# Patient Record
Sex: FEMALE | Race: WHITE | URBAN - METROPOLITAN AREA
[De-identification: names, ages, dates, MRNs, and addresses within clinical notes are randomized per-mention and may not be internally consistent; named-entity substitution may affect disease eponyms.]

---

## 2017-09-28 ENCOUNTER — THERAPY VISIT (OUTPATIENT)
Dept: PHYSICAL THERAPY | Facility: CLINIC | Age: 67
End: 2017-09-28
Payer: COMMERCIAL

## 2017-09-28 DIAGNOSIS — M25.552 HIP PAIN, LEFT: Primary | ICD-10-CM

## 2017-09-28 PROCEDURE — 97110 THERAPEUTIC EXERCISES: CPT | Mod: GP | Performed by: PHYSICAL THERAPIST

## 2017-09-28 PROCEDURE — 97161 PT EVAL LOW COMPLEX 20 MIN: CPT | Mod: GP | Performed by: PHYSICAL THERAPIST

## 2017-09-28 NOTE — MR AVS SNAPSHOT
After Visit Summary   9/28/2017    Sobia Mendez    MRN: [id number]           Patient Information     Date Of Birth          1950        Visit Information        Provider Department      9/28/2017 11:30 AM Krista Maldonado, PT [facility]        Today's Diagnoses     Hip pain, left    -  1       Follow-ups after your visit        Your next 10 appointments already scheduled     Oct 05, 2017  8:10 AM CDT   YOSI Extremity with Krista Maldonado PT   [facility] ([facility])    [address]  [facility] [address]   [phone]            Oct 11, 2017  3:10 PM CDT   YOSI Extremity with Krista Maldonado PT   [facility] ([facility])    [address]  [facility] [address]   [phone]            Oct 18, 2017  1:50 PM CDT   YOSI Extremity with Krista Maldonado PT   [facility] ([facility])    [address]  [facility] [address]   [phone]            Oct 25, 2017 11:20 AM CDT   YOSI Extremity with Krista Maldonado PT   [facility] ([facility])    [address]  [facility] [address]   [phone]              Who to contact     If you have questions or need follow up information about today's clinic visit or your schedule please contact [facility] PHYSICAL THERAPY directly at [phone].  Normal or non-critical lab and imaging results will be communicated to you by MyChart, letter or phone within 4 business days after the clinic has received the results. If you do not hear from us within 7 days, please contact the clinic through MyChart or phone. If you have a critical or abnormal lab result, we will notify you by phone as soon as  "possible.  Submit refill requests through [employer] or call your pharmacy and they will forward the refill request to us. Please allow 3 business days for your refill to be completed.          Additional Information About Your Visit        [employer] Information     [employer] lets you send messages to your doctor, view your test results, renew your prescriptions, schedule appointments and more. To sign up, go to [email or web address] . Click on \"Log in\" on the left side of the screen, which will take you to the Welcome page. Then click on \"Sign up Now\" on the right side of the page.     You will be asked to enter the access code listed below, as well as some personal information. Please follow the directions to create your username and password.     Your access code is: [email or web address]  Expires: 2017  3:08 PM     Your access code will  in 90 days. If you need help or a new code, please call your [place] clinic or [phone].        Care EveryWhere ID     This is your Care EveryWhere ID. This could be used by other organizations to access your [place] medical records  [id number]         Blood Pressure from Last 3 Encounters:   No data found for BP    Weight from Last 3 Encounters:   No data found for Wt              We Performed the Following     HC PT EVAL, LOW COMPLEXITY     YOSI INITIAL EVAL REPORT     THERAPEUTIC EXERCISES        Primary Care Provider Office Phone # Fax #    Vic Alexandre [phone]       [address] [address]        Equal Access to Services     [facility] CenterNAUN : Hadii aad ku hadasho Soomaali, waaxda luqadaha, qaybta kaalmada adeegyada, kendal jerome . So [facility] [phone].    ATENCIÓN: Si habla español, tiene a patricio disposición servicios gratuitos de asistencia lingüística. Llame al [phone].    We comply with applicable federal civil rights laws and [place] laws. We do not discriminate on " the basis of race, color, national origin, age, disability sex, sexual orientation or gender identity.            Thank you!     Thank you for choosing [facility] PHYSICAL [facility]  for your care. Our goal is always to provide you with excellent care. Hearing back from our patients is one way we can continue to improve our services. Please take a few minutes to complete the written survey that you may receive in the mail after your visit with us. Thank you!             Your Updated Medication List - Protect others around you: Learn how to safely use, store and throw away your medicines at www.disposemymeds.org.      Notice  As of 9/28/2017  3:08 PM    You have not been prescribed any medications.

## 2017-09-28 NOTE — LETTER
[facility]IC [facility] PHYSICAL THERAPY  [address]  [facility] [address] [phone]    2017    Re: Sobia Mendez   :   1950  MRN:  [id number]   REFERRING PHYSICIAN:   Pablo Allison    [facility]    Date of Initial Evaluation:  2017  Visits:  Rxs Used: 1  Reason for Referral:  Hip pain, left    EVALUATION SUMMARY    Subjective:    Patient is a 67 year old female presenting with rehab left hip hpi. The history is provided by the patient. No  was used.   Sobia Mendez is a 67 year old female with a left hip condition.  Condition occurred with:  Insidious onset.  Condition occurred: for unknown reasons.  This is a new condition  Pt began to have pain in her L hip that she first noticed in 2017 when she was trying to sleep. She had pain with ABD in the water during her aerobics to the point where she couldn't do it. She feels like her hip has slowly gotten a little better. She can now sleep on it but she can't stay asleep on her hip. PMH: RCR, TKA on L..    Patient reports pain:  Lateral and greater trochanter.  Radiates to:  Thigh and hip.  Pain is described as aching and is intermittent Pain Scale: when it wakes her at night around a 6-7/10.  Associated symptoms:  Loss of motion/stiffness. Pain is worse in the P.M. and worse during the night.  Symptoms are exacerbated by descending stairs, ascending stairs and lying on extremity and relieved by activity/movement (changes position at night).  Since onset symptoms are gradually improving.        General health as reported by patient is good.  Pertinent medical history includes:  Osteoporosis, history of fractures and asthma (fracture to pubic stress fracture in , stress fracture L hip in 2016).  Medical allergies: no.  Other surgeries include:  Orthopedic surgery (R RCR and L TKA).  Current medications:   Other (bone density).  Current occupation is Business office  .  Patient is working in normal job without restrictions.  Primary job tasks include:  Prolonged sitting.    Barriers include:  Stairs and no railing on stairs.    Red flags:  None as reported by the patient.  Objective:  Hip Evaluation  HIP AROM:  AROM:    Left Hip:     Normal (pain on end range Flexion only)    Right Hip:   Normal    Hip Palpation:    Left hip tenderness present at:   Greater Trachanter; IT Band and Gluteus Medius  Right hip tenderness not present at:  IT Band  Functional Testing:    Quad:    Single leg squat:    Left:    35  Moderate loss of control, femoral IR, hip substitution and excessive anterior knee excursion  Right:  45  Mild loss of control, hip substitution, femoral IR and decreased hip/trunk flexion  Bilateral leg squat:  75  Normal control and fermoral IR   Assessment/Plan:    Patient is a 67 year old female with left side hip complaints.    Patient has the following significant findings with corresponding treatment plan.                Diagnosis 1:  L hip pain    Pain -  hot/cold therapy, manual therapy, self management and home program  Decreased ROM/flexibility - manual therapy and therapeutic exercise  Decreased joint mobility - manual therapy and therapeutic exercise  Decreased strength - therapeutic exercise and therapeutic activities  Impaired muscle performance - neuro re-education  Decreased function - therapeutic activities    Therapy Evaluation Codes:   1) History comprised of:   Personal factors that impact the plan of care:      None.    Comorbidity factors that impact the plan of care are:      Osteoarthritis.     Medications impacting care: None.  2) Examination of Body Systems comprised of:   Body structures and functions that impact the plan of care:      Hip.   Activity limitations that impact the plan of care are:      Stairs and Sleeping.  3) Clinical presentation characteristics  are:   Stable/Uncomplicated.  4) Decision-Making    Low complexity using standardized patient assessment instrument and/or measureable assessment of functional outcome.  Cumulative Therapy Evaluation is: Low complexity.    Previous and current functional limitations:  (See Goal Flow Sheet for this information)    Short term and Long term goals: (See Goal Flow Sheet for this information)     Communication ability:  Patient appears to be able to clearly communicate and understand verbal and written communication and follow directions correctly.  Treatment Explanation - The following has been discussed with the patient:   RX ordered/plan of care  Anticipated outcomes  Possible risks and side effects  This patient would benefit from PT intervention to resume normal activities.   Rehab potential is good.    Frequency:  1 X week, once daily  Duration:  for 6 weeks  Discharge Plan:  Achieve all LTG.  Independent in home treatment program.  Reach maximal therapeutic benefit.    Thank you for your referral.    INQUIRIES  Therapist: Krista Barrett DPT  [facility]ERAP[facility]  [address] [place] [address]. [address]  [facility] [address]  Phone: [phone]  Fax: [phone]

## 2017-09-28 NOTE — PROGRESS NOTES
Subjective:    Patient is a 67 year old female presenting with rehab left hip hpi. The history is provided by the patient. No  was used.   Sobia Mendez is a 67 year old female with a left hip condition.  Condition occurred with:  Insidious onset.  Condition occurred: for unknown reasons.  This is a new condition  Pt began to have pain in her L hip that she first noticed in July of 2017 when she was trying to sleep. She had pain with ABD in the water during her aerobics to the point where she couldn't do it. She feels like her hip has slowly gotten a little better. She can now sleep on it but she can't stay asleep on her hip. PMH: RCR, TKA on L..    Patient reports pain:  Lateral and greater trochanter.  Radiates to:  Thigh and hip.  Pain is described as aching and is intermittent Pain Scale: when it wakes her at night around a 6-7/10.  Associated symptoms:  Loss of motion/stiffness. Pain is worse in the P.M. and worse during the night.  Symptoms are exacerbated by descending stairs, ascending stairs and lying on extremity and relieved by activity/movement (changes position at night).  Since onset symptoms are gradually improving.        General health as reported by patient is good.  Pertinent medical history includes:  Osteoporosis, history of fractures and asthma (fracture to pubic stress fracture in 1974, stress fracture L hip in 2016).  Medical allergies: no.  Other surgeries include:  Orthopedic surgery (R RCR and L TKA).  Current medications:  Other (bone density).  Current occupation is Business office  .  Patient is working in normal job without restrictions.  Primary job tasks include:  Prolonged sitting.    Barriers include:  Stairs and no railing on stairs.    Red flags:  None as reported by the patient.                        Objective:    System                                           Hip Evaluation  HIP AROM:  AROM:    Left Hip:     Normal (pain on end range Flexion only)     Right Hip:   Normal                        Hip Palpation:    Left hip tenderness present at:   Greater Trachanter; IT Band and Gluteus Medius    Right hip tenderness not present at:  IT Band  Functional Testing:          Quad:    Single leg squat:    Left:    35  Moderate loss of control, femoral IR, hip substitution and excessive anterior knee excursion  Right:  45  Mild loss of control, hip substitution, femoral IR and decreased hip/trunk flexion    Bilateral leg squat:  75  Normal control and fermoral IR                  General     ROS    Assessment/Plan:      Patient is a 67 year old female with left side hip complaints.    Patient has the following significant findings with corresponding treatment plan.                Diagnosis 1:  L hip pain    Pain -  hot/cold therapy, manual therapy, self management and home program  Decreased ROM/flexibility - manual therapy and therapeutic exercise  Decreased joint mobility - manual therapy and therapeutic exercise  Decreased strength - therapeutic exercise and therapeutic activities  Impaired muscle performance - neuro re-education  Decreased function - therapeutic activities    Therapy Evaluation Codes:   1) History comprised of:   Personal factors that impact the plan of care:      None.    Comorbidity factors that impact the plan of care are:      Osteoarthritis.     Medications impacting care: None.  2) Examination of Body Systems comprised of:   Body structures and functions that impact the plan of care:      Hip.   Activity limitations that impact the plan of care are:      Stairs and Sleeping.  3) Clinical presentation characteristics are:   Stable/Uncomplicated.  4) Decision-Making    Low complexity using standardized patient assessment instrument and/or measureable assessment of functional outcome.  Cumulative Therapy Evaluation is: Low complexity.    Previous and current functional limitations:  (See Goal Flow Sheet for this information)    Short term and  Long term goals: (See Goal Flow Sheet for this information)     Communication ability:  Patient appears to be able to clearly communicate and understand verbal and written communication and follow directions correctly.  Treatment Explanation - The following has been discussed with the patient:   RX ordered/plan of care  Anticipated outcomes  Possible risks and side effects  This patient would benefit from PT intervention to resume normal activities.   Rehab potential is good.    Frequency:  1 X week, once daily  Duration:  for 6 weeks  Discharge Plan:  Achieve all LTG.  Independent in home treatment program.  Reach maximal therapeutic benefit.    Please refer to the daily flowsheet for treatment today, total treatment time and time spent performing 1:1 timed codes.

## 2017-10-05 ENCOUNTER — THERAPY VISIT (OUTPATIENT)
Dept: PHYSICAL THERAPY | Facility: CLINIC | Age: 67
End: 2017-10-05
Payer: COMMERCIAL

## 2017-10-05 DIAGNOSIS — M25.552 HIP PAIN, LEFT: ICD-10-CM

## 2017-10-05 PROCEDURE — 97112 NEUROMUSCULAR REEDUCATION: CPT | Mod: GP | Performed by: PHYSICAL THERAPIST

## 2017-10-05 PROCEDURE — 97110 THERAPEUTIC EXERCISES: CPT | Mod: GP | Performed by: PHYSICAL THERAPIST

## 2017-10-05 PROCEDURE — 97140 MANUAL THERAPY 1/> REGIONS: CPT | Mod: GP | Performed by: PHYSICAL THERAPIST

## 2017-10-05 NOTE — MR AVS SNAPSHOT
After Visit Summary   10/5/2017    Sobia Mendez    MRN: [id number]           Patient Information     Date Of Birth          1950        Visit Information        Provider Department      10/5/2017 8:10 AM Krista Maldonado PT [facility]        Today's Diagnoses     Hip pain, left           Follow-ups after your visit        Your next 10 appointments already scheduled     Oct 11, 2017  3:10 PM CDT   YOSI Extremity with Krista Maldonado PT   [facility] ([facility])    [address]  [facility] [address]   [phone]            Oct 18, 2017  1:50 PM CDT   YOSI Extremity with Krista Maldonado PT   [facility] ([facility])    [address]  [facility] [address]   [phone]            Oct 25, 2017 11:20 AM CDT   YOSI Extremity with Krista Maldonado PT   [facility] ([facility])    [address]  [facility] [address]   [phone]              Who to contact     If you have questions or need follow up information about today's clinic visit or your schedule please contact [facility] PHYSICAL THERAPY directly at [phone].  Normal or non-critical lab and imaging results will be communicated to you by MyChart, letter or phone within 4 business days after the clinic has received the results. If you do not hear from us within 7 days, please contact the clinic through MyChart or phone. If you have a critical or abnormal lab result, we will notify you by phone as soon as possible.  Submit refill requests through [employer] or call your pharmacy and they will forward the refill request to us. Please allow 3 business days for your refill to be completed.          Additional Information About Your Visit       "  MyChart Information     [employer] lets you send messages to your doctor, view your test results, renew your prescriptions, schedule appointments and more. To sign up, go to [email or web address] . Click on \"Log in\" on the left side of the screen, which will take you to the Welcome page. Then click on \"Sign up Now\" on the right side of the page.     You will be asked to enter the access code listed below, as well as some personal information. Please follow the directions to create your username and password.     Your access code is: [email or web address]  Expires: 2017  3:08 PM     Your access code will  in 90 days. If you need help or a new code, please call your [place] clinic or [phone].        Care EveryWhere ID     This is your Care EveryWhere ID. This could be used by other organizations to access your [place] medical records  [id number]         Blood Pressure from Last 3 Encounters:   No data found for BP    Weight from Last 3 Encounters:   No data found for Wt              We Performed the Following     MANUAL THER TECH,1+REGIONS,EA 15 MIN     NEUROMUSCULAR RE-EDUCATION     THERAPEUTIC EXERCISES        Primary Care Provider Office Phone # Fax #    Vic Alexandre [phone]       [address] [address]        Equal Access to Services     YUE RODRIGUEZ : Hadii aad ku hadasho Soomaali, waaxda luqadaha, qaybta kaalmada amadoryaangela, kendal davis. So [facility] [phone].    ATENCIÓN: Si habla español, tiene a patricio disposición servicios gratuitos de asistencia lingüística. Alfonso al [phone].    We comply with applicable federal civil rights laws and [place] laws. We do not discriminate on the basis of race, color, national origin, age, disability, sex, sexual orientation, or gender identity.            Thank you!     Thank you for choosing [facility] PHYSICAL THERAPY  for " your care. Our goal is always to provide you with excellent care. Hearing back from our patients is one way we can continue to improve our services. Please take a few minutes to complete the written survey that you may receive in the mail after your visit with us. Thank you!             Your Updated Medication List - Protect others around you: Learn how to safely use, store and throw away your medicines at www.disposemymeds.org.      Notice  As of 10/5/2017  9:01 AM    You have not been prescribed any medications.

## 2017-10-05 NOTE — PROGRESS NOTES
"Subjective:    HPI                    Objective:    System    Physical Exam    General     ROS    Assessment/Plan:      SUBJECTIVE  Subjective changes as noted by pt:  Pt reports she is no longer getting the pain down her thigh and leg but her hip is pretty sore as she has been foam rolling her hip. She was very painful after appointment and had \"the worst restless leg symptoms that night and didn't sleep much if at all.\" She is getting a little groin pain that she had previously when she had stress fractures before.         Current pain level: 4/10     Changes in function:  Yes (See Goal flowsheet attached for changes in current functional level)     Adverse reaction to treatment or activity:  None    OBJECTIVE  Changes in objective findings:  Yes, L TFL and glute medius hypertonicity    Pt was unable to fire gluteal muscle without arching her low back in prone exercise--able to do it well with towel squeeze        ASSESSMENT  Sobia continues to require intervention to meet STG and LTG's: PT  Patient's symptoms are resolving.  Patient is progressing as expected.  Response to therapy has shown an improvement in  pain level and ROM   Progress made towards STG/LTG?  Yes (See Goal flowsheet attached for updates on achievement of STG and LTG)    PLAN  Current treatment program is being advanced to more complex exercises.    PTA/ATC plan:  N/A    Please refer to the daily flowsheet for treatment today, total treatment time and time spent performing 1:1 timed codes.              "

## 2017-10-11 ENCOUNTER — THERAPY VISIT (OUTPATIENT)
Dept: PHYSICAL THERAPY | Facility: CLINIC | Age: 67
End: 2017-10-11
Payer: COMMERCIAL

## 2017-10-11 DIAGNOSIS — M25.552 HIP PAIN, LEFT: ICD-10-CM

## 2017-10-11 PROCEDURE — 97140 MANUAL THERAPY 1/> REGIONS: CPT | Mod: GP | Performed by: PHYSICAL THERAPIST

## 2017-10-11 PROCEDURE — 97110 THERAPEUTIC EXERCISES: CPT | Mod: GP | Performed by: PHYSICAL THERAPIST

## 2017-10-11 PROCEDURE — 97112 NEUROMUSCULAR REEDUCATION: CPT | Mod: GP | Performed by: PHYSICAL THERAPIST

## 2017-10-11 NOTE — MR AVS SNAPSHOT
"              After Visit Summary   10/11/2017    Sobia Mendez    MRN: [id number]           Patient Information     Date Of Birth          1950        Visit Information        Provider Department      10/11/2017 3:10 PM Krista Maldonado PT [facility]        Today's Diagnoses     Hip pain, left           Follow-ups after your visit        Your next 10 appointments already scheduled     Oct 18, 2017  1:50 PM CDT   YOSI Extremity with Krista Maldonado PT   [facility] ([facility])    [address]  [facility] [id number] [phone]            Oct 25, 2017 11:20 AM CDT   [facility] Extremity with Krista Maldonado PT   [facility] ([facility])    [address]  [facility] [id number] [phone]              Who to contact     If you have questions or need follow up information about today's clinic visit or your schedule please contact [facility] [place] [place] [facility] directly at [phone].  Normal or non-critical lab and imaging results will be communicated to you by MyChart, letter or phone within 4 business days after the clinic has received the results. If you do not hear from us within 7 days, please contact the clinic through [employer] or phone. If you have a critical or abnormal lab result, we will notify you by phone as soon as possible.  Submit refill requests through [employer] or call your pharmacy and they will forward the refill request to us. Please allow 3 business days for your refill to be completed.          Additional Information About Your Visit        MyChart Information     [employer] lets you send messages to your doctor, view your test results, renew your prescriptions, schedule appointments and more. To sign up, go to [email or web address] . Click on \"Log in\" on the left side " "of the screen, which will take you to the Welcome page. Then click on \"Sign up Now\" on the right side of the page.     You will be asked to enter the access code listed below, as well as some personal information. Please follow the directions to create your username and password.     Your access code is: [email or web address]  Expires: 2017  3:08 PM     Your access code will  in 90 days. If you need help or a new code, please call your [facility] clinic or [phone].        Care EveryWhere ID     This is your Care EveryWhere ID. This could be used by other organizations to access your [facility] medical records  [id number]         Blood Pressure from Last 3 Encounters:   No data found for BP    Weight from Last 3 Encounters:   No data found for Wt              We Performed the Following     MANUAL THER TECH,1+REGIONS,EA 15 MIN     NEUROMUSCULAR RE-EDUCATION     THERAPEUTIC EXERCISES        Primary Care Provider Office Phone # Fax #    Vic Alexandre [phone]       [address] [address]        Equal Access to Services     CORNELL RODRIGUEZ : Hadii aad ku hadasho Soomaali, waaxda luqadaha, qaybta kaalmada aderafaelyaangela, kendal jerome . So [facility] [phone].    ATENCIÓN: Si habla español, tiene a patricio disposición servicios gratuitos de asistencia lingüística. [facility] [phone].    We comply with applicable federal civil rights laws and [place] laws. We do not discriminate on the basis of race, color, national origin, age, disability, sex, sexual orientation, or gender identity.            Thank you!     Thank you for choosing [facility]  for your care. Our goal is always to provide you with excellent care. Hearing back from our patients is one way we can continue to improve our services. Please take a few minutes to complete the written survey that you may receive in the " mail after your visit with us. Thank you!             Your Updated Medication List - Protect others around you: Learn how to safely use, store and throw away your medicines at www.disposemymeds.org.      Notice  As of 10/11/2017  3:52 PM    You have not been prescribed any medications.

## 2017-10-11 NOTE — PROGRESS NOTES
"Subjective:    HPI                    Objective:    System    Physical Exam    General     ROS    Assessment/Plan:      SUBJECTIVE  Subjective changes as noted by pt:  She feels like her pain is \"much better\" and sleeping is \"way better\" and she hasn't had to use her donut around the hip. She wasn't too sore after the last treatment. She hasn't had any restless leg sx either.        Current pain level: 0/10     Changes in function:  Yes (See Goal flowsheet attached for changes in current functional level)     Adverse reaction to treatment or activity:  None    OBJECTIVE  Changes in objective findings:  Yes, tender to palpation of L glute medius but improvement noted with less hypertonicity and fibrous nature of fibers.       Minimal hip drop with bridge #2 but after cuing was able to maintain proper positioning    ASSESSMENT  Sobia continues to require intervention to meet STG and LTG's: PT  Patient's symptoms are resolving.  Patient is progressing as expected.  Response to therapy has shown an improvement in  pain level, flexibility and strength  Progress made towards STG/LTG?  Yes (See Goal flowsheet attached for updates on achievement of STG and LTG)    PLAN  Current treatment program is being advanced to more complex exercises.    PTA/ATC plan:  N/A    Please refer to the daily flowsheet for treatment today, total treatment time and time spent performing 1:1 timed codes.              "